# Patient Record
Sex: FEMALE | Race: WHITE | Employment: FULL TIME | URBAN - METROPOLITAN AREA
[De-identification: names, ages, dates, MRNs, and addresses within clinical notes are randomized per-mention and may not be internally consistent; named-entity substitution may affect disease eponyms.]

---

## 2017-10-25 ENCOUNTER — OFFICE VISIT (OUTPATIENT)
Dept: FAMILY MEDICINE CLINIC | Age: 72
End: 2017-10-25

## 2017-10-25 VITALS
HEART RATE: 112 BPM | WEIGHT: 139.2 LBS | OXYGEN SATURATION: 96 % | DIASTOLIC BLOOD PRESSURE: 72 MMHG | BODY MASS INDEX: 24.66 KG/M2 | HEIGHT: 63 IN | SYSTOLIC BLOOD PRESSURE: 110 MMHG | RESPIRATION RATE: 16 BRPM

## 2017-10-25 DIAGNOSIS — J20.9 ACUTE BRONCHITIS WITH BRONCHOSPASM: Primary | ICD-10-CM

## 2017-10-25 DIAGNOSIS — Z78.0 POST-MENOPAUSAL: ICD-10-CM

## 2017-10-25 DIAGNOSIS — Z12.11 SCREENING FOR COLON CANCER: ICD-10-CM

## 2017-10-25 DIAGNOSIS — Z23 NEED FOR PROPHYLACTIC VACCINATION AGAINST STREPTOCOCCUS PNEUMONIAE (PNEUMOCOCCUS): ICD-10-CM

## 2017-10-25 PROBLEM — M81.0 OP (OSTEOPOROSIS): Status: ACTIVE | Noted: 2017-10-25

## 2017-10-25 PROCEDURE — G8427 DOCREV CUR MEDS BY ELIG CLIN: HCPCS | Performed by: FAMILY MEDICINE

## 2017-10-25 PROCEDURE — 1036F TOBACCO NON-USER: CPT | Performed by: FAMILY MEDICINE

## 2017-10-25 PROCEDURE — G8400 PT W/DXA NO RESULTS DOC: HCPCS | Performed by: FAMILY MEDICINE

## 2017-10-25 PROCEDURE — 1090F PRES/ABSN URINE INCON ASSESS: CPT | Performed by: FAMILY MEDICINE

## 2017-10-25 PROCEDURE — 99202 OFFICE O/P NEW SF 15 MIN: CPT | Performed by: FAMILY MEDICINE

## 2017-10-25 PROCEDURE — 3014F SCREEN MAMMO DOC REV: CPT | Performed by: FAMILY MEDICINE

## 2017-10-25 PROCEDURE — G8484 FLU IMMUNIZE NO ADMIN: HCPCS | Performed by: FAMILY MEDICINE

## 2017-10-25 PROCEDURE — 3017F COLORECTAL CA SCREEN DOC REV: CPT | Performed by: FAMILY MEDICINE

## 2017-10-25 PROCEDURE — 4040F PNEUMOC VAC/ADMIN/RCVD: CPT | Performed by: FAMILY MEDICINE

## 2017-10-25 PROCEDURE — G8420 CALC BMI NORM PARAMETERS: HCPCS | Performed by: FAMILY MEDICINE

## 2017-10-25 PROCEDURE — 1123F ACP DISCUSS/DSCN MKR DOCD: CPT | Performed by: FAMILY MEDICINE

## 2017-10-25 RX ORDER — DOXYCYCLINE HYCLATE 100 MG
100 TABLET ORAL 2 TIMES DAILY
Qty: 20 TABLET | Refills: 0 | Status: SHIPPED | OUTPATIENT
Start: 2017-10-25 | End: 2017-11-04

## 2017-10-25 ASSESSMENT — ENCOUNTER SYMPTOMS
COUGH: 1
SORE THROAT: 1
CHEST TIGHTNESS: 1
SHORTNESS OF BREATH: 1
ABDOMINAL PAIN: 0
RHINORRHEA: 1
WHEEZING: 1

## 2017-10-25 ASSESSMENT — PATIENT HEALTH QUESTIONNAIRE - PHQ9
SUM OF ALL RESPONSES TO PHQ QUESTIONS 1-9: 0
SUM OF ALL RESPONSES TO PHQ9 QUESTIONS 1 & 2: 0
1. LITTLE INTEREST OR PLEASURE IN DOING THINGS: 0
2. FEELING DOWN, DEPRESSED OR HOPELESS: 0

## 2017-10-25 NOTE — PATIENT INSTRUCTIONS
Patient Education        Bronchitis: Care Instructions  Your Care Instructions    Bronchitis is inflammation of the bronchial tubes, which carry air to the lungs. The tubes swell and produce mucus, or phlegm. The mucus and inflamed bronchial tubes make you cough. You may have trouble breathing. Most cases of bronchitis are caused by viruses like those that cause colds. Antibiotics usually do not help and they may be harmful. Bronchitis usually develops rapidly and lasts about 2 to 3 weeks in otherwise healthy people. Follow-up care is a key part of your treatment and safety. Be sure to make and go to all appointments, and call your doctor if you are having problems. It's also a good idea to know your test results and keep a list of the medicines you take. How can you care for yourself at home? · Take all medicines exactly as prescribed. Call your doctor if you think you are having a problem with your medicine. · Get some extra rest.  · Take an over-the-counter pain medicine, such as acetaminophen (Tylenol), ibuprofen (Advil, Motrin), or naproxen (Aleve) to reduce fever and relieve body aches. Read and follow all instructions on the label. · Do not take two or more pain medicines at the same time unless the doctor told you to. Many pain medicines have acetaminophen, which is Tylenol. Too much acetaminophen (Tylenol) can be harmful. · Take an over-the-counter cough medicine that contains dextromethorphan to help quiet a dry, hacking cough so that you can sleep. Avoid cough medicines that have more than one active ingredient. Read and follow all instructions on the label. · Breathe moist air from a humidifier, hot shower, or sink filled with hot water. The heat and moisture will thin mucus so you can cough it out. · Do not smoke. Smoking can make bronchitis worse. If you need help quitting, talk to your doctor about stop-smoking programs and medicines.  These can increase your chances of quitting for

## 2017-10-25 NOTE — PROGRESS NOTES
(Marck) 08/31/2016 452  ms Final    P Axis 08/31/2016 77  degrees Final    R Axis 08/31/2016 68  degrees Final    T Axis 08/31/2016 72  degrees Final    Diagnosis 08/31/2016    Final                    Value:Sinus tachycardia  Otherwise normal ECG  When compared with ECG of 28-AUG-2016 09:26,  No significant change was found  Confirmed by WILLI ERNANDEZ, Darinel Yairalexx ([id number]) on 8/29/2016 10:00:30 AM      Hemoglobin A1C 08/30/2016 5.9  See comment % Final    Comment: Comment:  Diagnosis of Diabetes: > or = 6.5%  Increased risk of diabetes (Prediabetes): 5.7-6.4%  Glycemic Control: Nonpregnant Adults: <7.0%                    Pregnant: <6.0%        eAG 08/30/2016 122.6  mg/dL Final    POC Glucose 08/29/2016 95  70 - 99 mg/dl Final    Performed on 08/29/2016 ACCU-CHEK   Final    POC Glucose 08/29/2016 101* 70 - 99 mg/dl Final    Performed on 08/29/2016 ACCU-CHEK   Final    Prolactin 08/30/2016 57.3  ng/mL Final    Comment: Default Normal Ranges    Female                     Male  Nonpregnant: 2.8-29.2      2.1-17.7  Pregnant:  9.7-208.5  Postmeno:  1.8-20.3      POC Glucose 08/30/2016 113* 70 - 99 mg/dl Final    Performed on 08/30/2016 ACCU-CHEK   Final    POC Glucose 08/30/2016 87  70 - 99 mg/dl Final    Performed on 08/30/2016 ACCU-CHEK   Final    POC Glucose 08/30/2016 75  70 - 99 mg/dl Final    Performed on 08/30/2016 ACCU-CHEK   Final    POC Glucose 08/30/2016 93  70 - 99 mg/dl Final    Performed on 08/30/2016 ACCU-CHEK   Final    POC Glucose 08/30/2016 109* 70 - 99 mg/dl Final    Performed on 08/30/2016 ACCU-CHEK   Final    POC Glucose 08/31/2016 89  70 - 99 mg/dl Final    Performed on 08/31/2016 ACCU-CHEK   Final     Family History   Problem Relation Age of Onset    Heart Disease Father      Current Outpatient Prescriptions   Medication Sig Dispense Refill    doxycycline hyclate (VIBRA-TABS) 100 MG tablet Take 1 tablet by mouth 2 times daily for 10 days 20 tablet 0    levETIRAcetam (KEPPRA) 500 Neurological: She is alert and oriented to person, place, and time. Skin: Skin is warm and dry. She is not diaphoretic. Psychiatric: She has a normal mood and affect. Her speech is normal and behavior is normal.       Assessment/Plan:   Marie was seen today for cough and chest congestion. Diagnoses and all orders for this visit:    Acute bronchitis with bronchospasm    Post-menopausal  -     DEXA Bone Density Axial Skeleton; Future    Need for prophylactic vaccination against Streptococcus pneumoniae (pneumococcus)    Screening for colon cancer  -     Ambulatory referral to Colonoscopy    Other orders  -     Cancel: Pneumococcal polysaccharide vaccine 23-valent PPSV23  -     doxycycline hyclate (VIBRA-TABS) 100 MG tablet; Take 1 tablet by mouth 2 times daily for 10 days        No Follow-up on file.     [address],   10/25/2017  4:02 PM

## 2017-12-15 ENCOUNTER — TELEPHONE (OUTPATIENT)
Dept: FAMILY MEDICINE CLINIC | Age: 72
End: 2017-12-15

## 2017-12-15 NOTE — TELEPHONE ENCOUNTER
This appears to be cold symptoms. I recommend Mucinex DM to be used   Vaporizer or Humidifier  Salt Water or Chloraseptic Gargles  Vitamin C, and Zinc  These viruses can last 7-10 days and are not improved by use of antibiotics.

## 2021-04-07 ENCOUNTER — IMMUNIZATION (OUTPATIENT)
Dept: PRIMARY CARE CLINIC | Age: 76
End: 2021-04-07
Payer: MEDICARE

## 2021-04-07 PROCEDURE — 0011A COVID-19, MODERNA VACCINE 100MCG/0.5ML DOSE: CPT | Performed by: FAMILY MEDICINE

## 2021-04-07 PROCEDURE — 91301 COVID-19, MODERNA VACCINE 100MCG/0.5ML DOSE: CPT | Performed by: FAMILY MEDICINE

## 2021-05-05 ENCOUNTER — IMMUNIZATION (OUTPATIENT)
Dept: PRIMARY CARE CLINIC | Age: 76
End: 2021-05-05
Payer: MEDICARE

## 2021-05-06 PROCEDURE — 0012A COVID-19, MODERNA VACCINE 100MCG/0.5ML DOSE: CPT | Performed by: FAMILY MEDICINE

## 2021-05-06 PROCEDURE — 91301 COVID-19, MODERNA VACCINE 100MCG/0.5ML DOSE: CPT | Performed by: FAMILY MEDICINE
